# Patient Record
Sex: MALE | Race: BLACK OR AFRICAN AMERICAN | HISPANIC OR LATINO | Employment: UNEMPLOYED | URBAN - METROPOLITAN AREA
[De-identification: names, ages, dates, MRNs, and addresses within clinical notes are randomized per-mention and may not be internally consistent; named-entity substitution may affect disease eponyms.]

---

## 2021-07-18 ENCOUNTER — HOSPITAL ENCOUNTER (EMERGENCY)
Facility: HOSPITAL | Age: 41
Discharge: HOME | End: 2021-07-18
Attending: EMERGENCY MEDICINE | Admitting: EMERGENCY MEDICINE

## 2021-07-18 VITALS
SYSTOLIC BLOOD PRESSURE: 128 MMHG | WEIGHT: 187 LBS | RESPIRATION RATE: 16 BRPM | TEMPERATURE: 98.6 F | DIASTOLIC BLOOD PRESSURE: 69 MMHG | HEART RATE: 74 BPM | OXYGEN SATURATION: 98 %

## 2021-07-18 DIAGNOSIS — T15.92XA FOREIGN BODY OF LEFT EYE, INITIAL ENCOUNTER: Primary | ICD-10-CM

## 2021-07-18 PROCEDURE — 99283 EMERGENCY DEPT VISIT LOW MDM: CPT

## 2021-07-18 PROCEDURE — 99284 EMERGENCY DEPT VISIT MOD MDM: CPT | Performed by: PHYSICIAN ASSISTANT

## 2021-07-18 RX ORDER — ERYTHROMYCIN 5 MG/G
0.5 OINTMENT OPHTHALMIC EVERY 6 HOURS
Qty: 3.5 G | Refills: 0 | Status: SHIPPED | OUTPATIENT
Start: 2021-07-18 | End: 2021-07-28

## 2021-07-18 RX ORDER — NAPROXEN 500 MG/1
500 TABLET ORAL 2 TIMES DAILY WITH MEALS
Qty: 20 TABLET | Refills: 0 | Status: SHIPPED | OUTPATIENT
Start: 2021-07-18 | End: 2022-07-18

## 2021-07-18 RX ORDER — TETRACAINE HYDROCHLORIDE 5 MG/ML
2 SOLUTION OPHTHALMIC ONCE
Status: COMPLETED | OUTPATIENT
Start: 2021-07-18 | End: 2021-07-18

## 2021-07-18 RX ORDER — TRAMADOL HYDROCHLORIDE 50 MG/1
50 TABLET ORAL EVERY 6 HOURS PRN
Qty: 15 TABLET | Refills: 0 | Status: SHIPPED | OUTPATIENT
Start: 2021-07-18 | End: 2021-07-28

## 2021-07-18 RX ORDER — KETOROLAC TROMETHAMINE 10 MG/1
10 TABLET, FILM COATED ORAL EVERY 6 HOURS PRN
Qty: 20 TABLET | Refills: 0 | Status: SHIPPED | OUTPATIENT
Start: 2021-07-18 | End: 2021-07-18 | Stop reason: CLARIF

## 2021-07-18 RX ADMIN — FLUORESCEIN SODIUM 1 STRIP: 1 STRIP OPHTHALMIC at 13:11

## 2021-07-18 RX ADMIN — TETRACAINE HYDROCHLORIDE 2 DROP: 5 SOLUTION OPHTHALMIC at 13:11

## 2021-07-18 NOTE — ED PROVIDER NOTES
History  Chief Complaint   Patient presents with    Eye Pain     "stone in the eye"     Patient is a 26-year-old male presents for evaluation of a foreign body sensation to his left eye that is been ongoing since Friday patient reports he was working with loss of gravel around him and feels as though he got a small stone in his left eye patient reports at times he has blurry vision however denies any loss of vision or double vision  Patient reports no redness or swelling around the area and reports he does not wear contact lenses or glasses  History provided by:  Patient   used: No    Eye Pain  Location:  Left eye  Quality:  Foreign body sensation  Severity:  Moderate  Onset quality:  Gradual  Duration:  3 days  Timing:  Constant  Progression:  Unchanged  Chronicity:  New  Context:  Jimi Cozier filled area  Relieved by:  None tried  Worsened by:  Rubbing  Ineffective treatments:  None  Associated symptoms: no abdominal pain, no chest pain, no congestion, no ear pain, no fatigue, no fever, no nausea, no rash, no rhinorrhea, no shortness of breath, no sore throat and no vomiting        None       History reviewed  No pertinent past medical history  History reviewed  No pertinent surgical history  History reviewed  No pertinent family history  I have reviewed and agree with the history as documented  E-Cigarette/Vaping    E-Cigarette Use Never User      E-Cigarette/Vaping Substances    Nicotine No     THC No     CBD No     Flavoring No     Other No     Unknown No      Social History     Tobacco Use    Smoking status: Never Smoker    Smokeless tobacco: Never Used   Vaping Use    Vaping Use: Never used   Substance Use Topics    Alcohol use: Never    Drug use: Never       Review of Systems   Constitutional: Negative for chills, fatigue and fever  HENT: Negative for congestion, ear pain, rhinorrhea and sore throat  Eyes: Positive for pain and redness     Respiratory: Negative for chest tightness and shortness of breath  Cardiovascular: Negative for chest pain and palpitations  Gastrointestinal: Negative for abdominal pain, nausea and vomiting  Genitourinary: Negative for dysuria and hematuria  Musculoskeletal: Negative  Skin: Negative for rash  Neurological: Negative for dizziness, syncope, light-headedness and numbness  Physical Exam  Physical Exam  Vitals and nursing note reviewed  Constitutional:       Appearance: Normal appearance  He is well-developed  HENT:      Head: Normocephalic and atraumatic  Eyes:      General: Lids are normal  No scleral icterus  Left eye: Foreign body present  Extraocular Movements: Extraocular movements intact  Conjunctiva/sclera:      Left eye: Left conjunctiva is injected  Pupils: Pupils are equal, round, and reactive to light  Cardiovascular:      Rate and Rhythm: Normal rate and regular rhythm  Pulses: Normal pulses  Pulmonary:      Effort: Pulmonary effort is normal  No respiratory distress  Breath sounds: No stridor  Abdominal:      General: There is no distension  Palpations: There is no mass  Musculoskeletal:      Cervical back: Normal range of motion  Skin:     General: Skin is warm and dry  Capillary Refill: Capillary refill takes less than 2 seconds  Coloration: Skin is not jaundiced  Neurological:      Mental Status: He is alert and oriented to person, place, and time        Gait: Gait normal    Psychiatric:         Mood and Affect: Mood normal          Vital Signs  ED Triage Vitals [07/18/21 1243]   Temperature Pulse Respirations Blood Pressure SpO2   98 6 °F (37 °C) 74 16 128/69 98 %      Temp Source Heart Rate Source Patient Position - Orthostatic VS BP Location FiO2 (%)   Tympanic Monitor Sitting Left arm --      Pain Score       Worst Possible Pain           Vitals:    07/18/21 1243   BP: 128/69   Pulse: 74   Patient Position - Orthostatic VS: Sitting Visual Acuity  Visual Acuity      Most Recent Value   Visual acuity R eye is  20/25   Visual acuity Left eye is  20/100   Visual acuity in both eyes is  Other [20/15]          ED Medications  Medications   fluorescein sodium sterile ophthalmic strip 1 strip (1 strip Both Eyes Given 7/18/21 1311)   tetracaine 0 5 % ophthalmic solution 2 drop (2 drops Left Eye Given 7/18/21 1311)       Diagnostic Studies  Results Reviewed     None                 No orders to display              Procedures  Foreign Body - Embedded    Date/Time: 7/18/2021 12:51 PM  Performed by: Enoc Tyler PA-C  Authorized by: Enoc Tyler PA-C     Patient location:  ED  Other Assisting Provider: No    Consent:     Consent obtained:  Verbal    Consent given by:  Patient    Risks discussed:  Incomplete removal and pain    Alternatives discussed:  No treatment, observation and referral  Universal protocol:     Procedure explained and questions answered to patient or proxy's satisfaction: yes      Patient identity confirmed:  Verbally with patient  Location:     Location: eye  Tendon involvement:  None  Pre-procedure details:     Imaging:  None  Anesthesia (see MAR for exact dosages): Anesthesia method:  Topical application (tetracaine)  Procedure details:     Incision length:  No incision, Q tip and wooden end of Qtip used, small fragment removed, however the rest remained embedded      Dissection of underlying tissues: no      Foreign bodies recovered:  1    Intact foreign body removal: no    Post-procedure details:     Confirmation:  Residual foreign bodies remain    Skin closure:  None             ED Course                                           MDM    Disposition  Final diagnoses:   Foreign body of left eye, initial encounter     Time reflects when diagnosis was documented in both MDM as applicable and the Disposition within this note     Time User Action Codes Description Comment    7/18/2021  1:20 PM Anil Lauren Saucedo Add [[id number]] Foreign body of left eye, initial encounter       ED Disposition     ED Disposition Condition Date/Time Comment    Discharge Good Sun Jul 18, 2021  1:19 PM Yovani Acosta discharge to home/self care              Follow-up Information     Follow up With Specialties Details Why [facility] Schedule an appointment as soon as possible for a visit in 2 days As needed [address]   [phone]            Discharge Medication List as of 7/18/2021  1:26 PM      START taking these medications    Details   erythromycin (ILOTYCIN) ophthalmic ointment Administer 0 5 inches to both eyes every 6 (six) hours for 10 days, Starting Sun 7/18/2021, Until Wed 7/28/2021, Normal      naproxen (EC NAPROSYN) 500 MG EC tablet Take 1 tablet (500 mg total) by mouth 2 (two) times a day with meals, Starting Sun 7/18/2021, Until Mon 7/18/2022, Normal      traMADol (ULTRAM) 50 mg tablet Take 1 tablet (50 mg total) by mouth every 6 (six) hours as needed for moderate pain for up to 10 days, Starting Sun 7/18/2021, Until Wed 7/28/2021 at 2359, Normal               PDMP Review     None          ED Provider  Electronically Signed by           Levi Rocha PA-C  07/18/21 9709